# Patient Record
Sex: FEMALE | Race: WHITE | NOT HISPANIC OR LATINO | URBAN - METROPOLITAN AREA
[De-identification: names, ages, dates, MRNs, and addresses within clinical notes are randomized per-mention and may not be internally consistent; named-entity substitution may affect disease eponyms.]

---

## 2018-10-01 ENCOUNTER — OUTPATIENT (OUTPATIENT)
Dept: OUTPATIENT SERVICES | Facility: HOSPITAL | Age: 53
LOS: 1 days | End: 2018-10-01
Payer: MEDICARE

## 2018-11-15 DIAGNOSIS — Z71.89 OTHER SPECIFIED COUNSELING: ICD-10-CM

## 2019-01-14 ENCOUNTER — APPOINTMENT (OUTPATIENT)
Dept: DERMATOLOGY | Facility: CLINIC | Age: 54
End: 2019-01-14
Payer: MEDICARE

## 2019-01-14 PROCEDURE — 99202 OFFICE O/P NEW SF 15 MIN: CPT

## 2019-01-28 ENCOUNTER — APPOINTMENT (OUTPATIENT)
Dept: NEUROLOGY | Facility: CLINIC | Age: 54
End: 2019-01-28

## 2019-04-01 PROCEDURE — G9005: CPT

## 2020-01-14 ENCOUNTER — APPOINTMENT (OUTPATIENT)
Dept: DERMATOLOGY | Facility: CLINIC | Age: 55
End: 2020-01-14

## 2020-04-10 ENCOUNTER — APPOINTMENT (OUTPATIENT)
Dept: DERMATOLOGY | Facility: CLINIC | Age: 55
End: 2020-04-10

## 2020-08-29 ENCOUNTER — APPOINTMENT (OUTPATIENT)
Dept: DERMATOLOGY | Facility: CLINIC | Age: 55
End: 2020-08-29

## 2020-10-06 ENCOUNTER — EMERGENCY (EMERGENCY)
Facility: HOSPITAL | Age: 55
LOS: 1 days | Discharge: HOME | End: 2020-10-06
Attending: EMERGENCY MEDICINE
Payer: MEDICARE

## 2020-10-06 VITALS
SYSTOLIC BLOOD PRESSURE: 150 MMHG | RESPIRATION RATE: 18 BRPM | DIASTOLIC BLOOD PRESSURE: 89 MMHG | WEIGHT: 218.92 LBS | HEIGHT: 62 IN | OXYGEN SATURATION: 98 % | TEMPERATURE: 98 F | HEART RATE: 67 BPM

## 2020-10-06 VITALS
DIASTOLIC BLOOD PRESSURE: 77 MMHG | RESPIRATION RATE: 18 BRPM | TEMPERATURE: 97 F | OXYGEN SATURATION: 97 % | HEART RATE: 76 BPM | SYSTOLIC BLOOD PRESSURE: 118 MMHG

## 2020-10-06 DIAGNOSIS — F41.9 ANXIETY DISORDER, UNSPECIFIED: ICD-10-CM

## 2020-10-06 LAB
ALBUMIN SERPL ELPH-MCNC: 3.6 G/DL — SIGNIFICANT CHANGE UP (ref 3.3–5.2)
ALP SERPL-CCNC: 130 U/L — HIGH (ref 40–120)
ALT FLD-CCNC: 24 U/L — SIGNIFICANT CHANGE UP
AMPHET UR-MCNC: NEGATIVE — SIGNIFICANT CHANGE UP
ANION GAP SERPL CALC-SCNC: 10 MMOL/L — SIGNIFICANT CHANGE UP (ref 5–17)
APAP SERPL-MCNC: <3 UG/ML — LOW (ref 10–26)
APPEARANCE UR: CLEAR — SIGNIFICANT CHANGE UP
AST SERPL-CCNC: 21 U/L — SIGNIFICANT CHANGE UP
BACTERIA # UR AUTO: NEGATIVE — SIGNIFICANT CHANGE UP
BARBITURATES UR SCN-MCNC: POSITIVE
BENZODIAZ UR-MCNC: NEGATIVE — SIGNIFICANT CHANGE UP
BILIRUB SERPL-MCNC: 0.2 MG/DL — LOW (ref 0.4–2)
BILIRUB UR-MCNC: NEGATIVE — SIGNIFICANT CHANGE UP
BUN SERPL-MCNC: 11 MG/DL — SIGNIFICANT CHANGE UP (ref 8–20)
CALCIUM SERPL-MCNC: 8.6 MG/DL — SIGNIFICANT CHANGE UP (ref 8.6–10.2)
CHLORIDE SERPL-SCNC: 105 MMOL/L — SIGNIFICANT CHANGE UP (ref 98–107)
CO2 SERPL-SCNC: 27 MMOL/L — SIGNIFICANT CHANGE UP (ref 22–29)
COCAINE METAB.OTHER UR-MCNC: NEGATIVE — SIGNIFICANT CHANGE UP
COLOR SPEC: YELLOW — SIGNIFICANT CHANGE UP
CREAT SERPL-MCNC: 0.78 MG/DL — SIGNIFICANT CHANGE UP (ref 0.5–1.3)
DIFF PNL FLD: ABNORMAL
EPI CELLS # UR: SIGNIFICANT CHANGE UP
ETHANOL SERPL-MCNC: <10 MG/DL — SIGNIFICANT CHANGE UP
GLUCOSE SERPL-MCNC: 94 MG/DL — SIGNIFICANT CHANGE UP (ref 70–99)
GLUCOSE UR QL: NEGATIVE MG/DL — SIGNIFICANT CHANGE UP
HCT VFR BLD CALC: 43.7 % — SIGNIFICANT CHANGE UP (ref 34.5–45)
HGB BLD-MCNC: 14.2 G/DL — SIGNIFICANT CHANGE UP (ref 11.5–15.5)
KETONES UR-MCNC: NEGATIVE — SIGNIFICANT CHANGE UP
LEUKOCYTE ESTERASE UR-ACNC: ABNORMAL
MCHC RBC-ENTMCNC: 28.9 PG — SIGNIFICANT CHANGE UP (ref 27–34)
MCHC RBC-ENTMCNC: 32.5 GM/DL — SIGNIFICANT CHANGE UP (ref 32–36)
MCV RBC AUTO: 88.8 FL — SIGNIFICANT CHANGE UP (ref 80–100)
METHADONE UR-MCNC: NEGATIVE — SIGNIFICANT CHANGE UP
NITRITE UR-MCNC: NEGATIVE — SIGNIFICANT CHANGE UP
OPIATES UR-MCNC: NEGATIVE — SIGNIFICANT CHANGE UP
PCP SPEC-MCNC: SIGNIFICANT CHANGE UP
PCP UR-MCNC: NEGATIVE — SIGNIFICANT CHANGE UP
PH UR: 7 — SIGNIFICANT CHANGE UP (ref 5–8)
PHENOBARB SERPL-MCNC: 20.4 UG/ML — SIGNIFICANT CHANGE UP (ref 15–40)
PLATELET # BLD AUTO: 200 K/UL — SIGNIFICANT CHANGE UP (ref 150–400)
POTASSIUM SERPL-MCNC: 4.2 MMOL/L — SIGNIFICANT CHANGE UP (ref 3.5–5.3)
POTASSIUM SERPL-SCNC: 4.2 MMOL/L — SIGNIFICANT CHANGE UP (ref 3.5–5.3)
PROT SERPL-MCNC: 7.1 G/DL — SIGNIFICANT CHANGE UP (ref 6.6–8.7)
PROT UR-MCNC: NEGATIVE MG/DL — SIGNIFICANT CHANGE UP
RBC # BLD: 4.92 M/UL — SIGNIFICANT CHANGE UP (ref 3.8–5.2)
RBC # FLD: 13.2 % — SIGNIFICANT CHANGE UP (ref 10.3–14.5)
RBC CASTS # UR COMP ASSIST: ABNORMAL /HPF (ref 0–4)
SALICYLATES SERPL-MCNC: <0.6 MG/DL — LOW (ref 10–20)
SARS-COV-2 RNA SPEC QL NAA+PROBE: SIGNIFICANT CHANGE UP
SODIUM SERPL-SCNC: 141 MMOL/L — SIGNIFICANT CHANGE UP (ref 135–145)
SP GR SPEC: 1.01 — SIGNIFICANT CHANGE UP (ref 1.01–1.02)
THC UR QL: NEGATIVE — SIGNIFICANT CHANGE UP
UROBILINOGEN FLD QL: NEGATIVE MG/DL — SIGNIFICANT CHANGE UP
WBC # BLD: 5.7 K/UL — SIGNIFICANT CHANGE UP (ref 3.8–10.5)
WBC # FLD AUTO: 5.7 K/UL — SIGNIFICANT CHANGE UP (ref 3.8–10.5)
WBC UR QL: SIGNIFICANT CHANGE UP

## 2020-10-06 PROCEDURE — 36415 COLL VENOUS BLD VENIPUNCTURE: CPT

## 2020-10-06 PROCEDURE — 99285 EMERGENCY DEPT VISIT HI MDM: CPT

## 2020-10-06 PROCEDURE — U0003: CPT

## 2020-10-06 PROCEDURE — 80053 COMPREHEN METABOLIC PANEL: CPT

## 2020-10-06 PROCEDURE — 80307 DRUG TEST PRSMV CHEM ANLYZR: CPT

## 2020-10-06 PROCEDURE — 85027 COMPLETE CBC AUTOMATED: CPT

## 2020-10-06 PROCEDURE — 99284 EMERGENCY DEPT VISIT MOD MDM: CPT | Mod: CS

## 2020-10-06 PROCEDURE — 81001 URINALYSIS AUTO W/SCOPE: CPT

## 2020-10-06 PROCEDURE — 99284 EMERGENCY DEPT VISIT MOD MDM: CPT

## 2020-10-06 PROCEDURE — 80184 ASSAY OF PHENOBARBITAL: CPT

## 2020-10-06 NOTE — ED ADULT TRIAGE NOTE - NS ED TRIAGE AVPU SCALE
tea
Alert-The patient is alert, awake and responds to voice. The patient is oriented to time, place, and person. The triage nurse is able to obtain subjective information.
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
## 2020-10-06 NOTE — ED BEHAVIORAL HEALTH ASSESSMENT NOTE - NS ED BHA MSE GENERAL APPEARANCE
"Requested Prescriptions   Pending Prescriptions Disp Refills     topiramate (TOPAMAX) 50 MG tablet [Pharmacy Med Name: TOPIRAMATE 50 MG TABLET] 60 tablet 0     Sig: TAKE 1 TABLET BY MOUTH TWICE A DAY    Anti-Seizure Meds Protocol  Failed - 2/7/2019  1:59 AM       Failed - Review Authorizing provider's last note.     Refer to last progress notes: confirm request is for original authorizing provider (cannot be through other providers).       Failed - Normal CBC on file in past 26 months    Recent Labs   Lab Test 08/24/16  0828   WBC 7.8   RBC 4.61   HGB 14.5   HCT 42.9             Failed - Normal platelet count on file in past 26 months    Recent Labs   Lab Test 08/24/16  0828             Passed - Recent (12 mo) or future (30 days) visit within the authorizing provider's specialty    Patient had office visit in the last 12 months or has a visit in the next 30 days with authorizing provider or within the authorizing provider's specialty.  See \"Patient Info\" tab in inbasket, or \"Choose Columns\" in Meds & Orders section of the refill encounter.           Passed - Normal ALT or AST on file in past 26 months    Recent Labs   Lab Test 01/11/19  0818   ALT 31     Recent Labs   Lab Test 01/11/19  0818   AST 20          Passed - Medication is active on med list        topiramate (TOPAMAX) 50 MG tablet  Rx was sent 01/11/2019 for 90 tabs and 3 refills.   Pharmacy notified via E-prescribe refusal.  Chrissy Fountain, RN, BSN       "
Well developed

## 2020-10-06 NOTE — ED PROVIDER NOTE - PHYSICAL EXAMINATION
Alert, lucid, and  anx. Pt is normocephalic, atraumatic.  Pupils are equal, round, no dysmetria. External ear without bleeding or mass, no nasal discharge or malodor, lips pink, moist mucous membranes, tongue midline. Neck supple.   Lungs clear . Heart regular rate and rhythm, normal S1, S2, no murmurs, gallops, rubs.  Abdomen is soft, nontender, no pulsatile mass, no masses, no distension, no rebound. No CVA Tenderness, no suprapubic tenderness.   Non-focal sensory, 5 out of 5 motor strength. CN2 to 12 intact. Skin without rash,psych - denies si or hi
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
## 2020-10-06 NOTE — ED ADULT NURSE NOTE - CCCP TRG CHIEF CMPLNT
Lm to call back, she needs to speak to RN triage. TY  
Patients LMP was 5/7. Patient was scheduled by different offic for FOB without an US. Patient calling today to be seen sooner for an appointment. Patient states she has been having spotting and cramping.    Patient was informed that she may need to change her appointment because she has not yet had an US/  
Spoke to patient, she stated that she has had some spotting when she wipes but has not needed to place a pantyliner.  She reports that there have been 2 or 3 episodes of spotting since she found out she was pregnant.  The patient also says she has had some on and off cramping but more so at the beginning of the month.  It was explained that the cramping at the beginning of the month may have been due to implantation as well as the spotting at that time.   The patient was also told that there are many reasons for spotting in pregnancy as well so, at this point, she should continue to monitor her symptoms.  She was advised to call if she begins to have menstrual-like bleeding and/or severe cramping.  The patient stated her understanding and agreement.   
medical evaluation

## 2020-10-06 NOTE — ED PROVIDER NOTE - PATIENT PORTAL LINK FT
You can access the FollowMyHealth Patient Portal offered by [facility] by registering at the following website: [email or web address]. By joining [employer]’s FollowMyHealth portal, you will also be able to view your health information using other applications (apps) compatible with our system.
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
## 2020-10-06 NOTE — ED BEHAVIORAL HEALTH ASSESSMENT NOTE - DESCRIPTION
See ED notes     Vital Signs Last 24 Hrs  T(C): 36.6 (06 Oct 2020 09:58), Max: 36.6 (06 Oct 2020 09:58)  T(F): 97.8 (06 Oct 2020 09:58), Max: 97.8 (06 Oct 2020 09:58)  HR: 67 (06 Oct 2020 09:58) (67 - 67)  BP: 150/89 (06 Oct 2020 09:58) (150/89 - 150/89)  RR: 18 (06 Oct 2020 09:58) (18 - 18)  SpO2: 98% (06 Oct 2020 09:58) (98% - 98%)    Urinalysis Basic - ( 06 Oct 2020 11:37 )    Color: Yellow / Appearance: Clear / S.010 / pH: x  Gluc: x / Ketone: Negative  / Bili: Negative / Urobili: Negative mg/dL   Blood: x / Protein: Negative mg/dL / Nitrite: Negative   Leuk Esterase: Trace / RBC: 3-5 /HPF / WBC 3-5   Sq Epi: x / Non Sq Epi: Occasional / Bacteria: Negative    Barbiturates Screen, Urine (10.06.20 @ 11:37)    Barbiturates Screen, Urine: Positive Seizure disorder Lives in supportive housing through [facility]
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
## 2020-10-23 ENCOUNTER — APPOINTMENT (OUTPATIENT)
Dept: DERMATOLOGY | Facility: CLINIC | Age: 55
End: 2020-10-23
Payer: MEDICARE

## 2020-10-23 PROCEDURE — 99214 OFFICE O/P EST MOD 30 MIN: CPT | Mod: 25

## 2020-10-23 PROCEDURE — 17110 DESTRUCTION B9 LES UP TO 14: CPT

## 2020-12-06 ENCOUNTER — EMERGENCY (EMERGENCY)
Facility: HOSPITAL | Age: 55
LOS: 1 days | Discharge: HOME | End: 2020-12-06
Attending: EMERGENCY MEDICINE
Payer: MEDICARE

## 2020-12-06 VITALS
OXYGEN SATURATION: 96 % | RESPIRATION RATE: 18 BRPM | DIASTOLIC BLOOD PRESSURE: 74 MMHG | SYSTOLIC BLOOD PRESSURE: 126 MMHG | HEART RATE: 87 BPM | TEMPERATURE: 98 F

## 2020-12-06 VITALS — HEIGHT: 62 IN

## 2020-12-06 PROCEDURE — 99283 EMERGENCY DEPT VISIT LOW MDM: CPT
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
## 2020-12-06 NOTE — ED ADULT TRIAGE NOTE - CHIEF COMPLAINT QUOTE
As per EMS, pt combative at home and presents with altered mental status. Pt denies any complaints at this time, yelling at staff in triage. MD CG at bedside for evaluation. Security and [employer] at bedside.

## 2020-12-06 NOTE — ED PROVIDER NOTE - PATIENT PORTAL LINK FT
You can access the FollowMyHealth Patient Portal offered by [facility] by registering at the following website: [email or web address]. By joining [employer]’s FollowMyHealth portal, you will also be able to view your health information using other applications (apps) compatible with our system.

## 2020-12-11 ENCOUNTER — TRANSCRIPTION ENCOUNTER (OUTPATIENT)
Age: 55
End: 2020-12-11

## 2021-03-12 ENCOUNTER — EMERGENCY (EMERGENCY)
Facility: HOSPITAL | Age: 56
LOS: 1 days | Discharge: HOME | End: 2021-03-12
Attending: EMERGENCY MEDICINE
Payer: MEDICARE

## 2021-03-12 VITALS
TEMPERATURE: 98 F | HEIGHT: 62 IN | RESPIRATION RATE: 16 BRPM | DIASTOLIC BLOOD PRESSURE: 89 MMHG | OXYGEN SATURATION: 95 % | WEIGHT: 149.91 LBS | HEART RATE: 98 BPM | SYSTOLIC BLOOD PRESSURE: 148 MMHG

## 2021-03-12 PROCEDURE — 99283 EMERGENCY DEPT VISIT LOW MDM: CPT

## 2021-03-12 NOTE — ED PROVIDER NOTE - NSFOLLOWUPINSTRUCTIONS_ED_ALL_ED_FT
Follow up with your  if you have more questions about your housing. Return to ED for any medical issues needing emergent attention.
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
## 2021-03-12 NOTE — ED PROVIDER NOTE - PATIENT PORTAL LINK FT
You can access the FollowMyHealth Patient Portal offered by [facility] by registering at the following website: [email or web address]. By joining [email or web address]’s FollowMyHealth portal, you will also be able to view your health information using other applications (apps) compatible with our system.

## 2021-03-12 NOTE — ED ADULT NURSE REASSESSMENT NOTE - NS ED NURSE REASSESS COMMENT FT1
pt awaiting social work, reports she wants to go home, she has been here all day and would like to leave. pt is awake alert oriented in no apparent distress, even and unlabored resps, AOX3, ambulates in ED without difficulty. states she spoke with her  already and wants to go. SW and ER MD aware.

## 2021-03-12 NOTE — ED ADULT NURSE NOTE - OBJECTIVE STATEMENT
pt BIBA from home with reports of not liking where she lives and having back pain. pt awake and alert to person place and time, report she needs help with housing. pt with no oboivus injuries, even and unlabored resps, no fevers, VSS.

## 2021-05-07 NOTE — ED PROVIDER NOTE - CROS ED [facility] ALL NEG
Detail Level: Zone
Plan: Pt given copy of her pathology and counseled heavily on her diagnosis
Continue Regimen: Minocycline 100mg BID
Initiate Treatment: Ordered labs and Chest X Ray, refer to ophthalmologist and pulmonologist
negative...
NP from neurology

## 2021-09-02 PROBLEM — R56.9 UNSPECIFIED CONVULSIONS: Chronic | Status: ACTIVE | Noted: 2021-03-12

## 2021-09-02 PROBLEM — F32.9 MAJOR DEPRESSIVE DISORDER, SINGLE EPISODE, UNSPECIFIED: Chronic | Status: ACTIVE | Noted: 2021-03-12

## 2021-10-05 ENCOUNTER — APPOINTMENT (OUTPATIENT)
Dept: DERMATOLOGY | Facility: CLINIC | Age: 56
End: 2021-10-05

## 2021-10-15 ENCOUNTER — APPOINTMENT (OUTPATIENT)
Dept: DERMATOLOGY | Facility: CLINIC | Age: 56
End: 2021-10-15
Payer: MEDICARE

## 2021-10-15 PROCEDURE — 99214 OFFICE O/P EST MOD 30 MIN: CPT

## 2022-02-04 NOTE — ED BEHAVIORAL HEALTH ASSESSMENT NOTE - OTHER
EMERGENCY DEPARTMENT ENCOUNTER    Pt Name: Dalia Hendricks  MRN: [id number]  Pt :   2003  Room Number:  26SF/26  Date of encounter:  2022  PCP: Trace Mccauley MD  ED Provider: Shankar Valdivia MD    Historian: Patient and mother      HPI:  Chief Complaint: Abdominal pain        Context: Dalia Hendricks is a 18 y.o. female who presents to the ED c/o abdominal pain which started today around 11 AM.  The discomfort is throughout her lower abdomen.  It ramped up very quickly to a 8 out of 10 on the pain scale level.  She describes an achy sensation.  The discomfort gradually improved and now is down to a 3 out of 10 on the pain scale.  Most of her discomfort is in the left low to mid abdomen.  She did have a bowel movement prior to coming to the emergency department.  She does not feel that she is constipated but did have an ED visit for abdominal pain when she was much younger and the diagnosis was constipation.  She has not previously seen an OB/GYN and does not know of any ovarian cysts.  She did start her menstrual cycle last night.  She did have a similar episode of pain roughly 3 years ago associated with menses.  The patient was transiently nauseated and did vomit prior to coming to the emergency department.  She now feels mildly nauseated but otherwise relatively well.  She has had no dysuria or hematuria.      PAST MEDICAL HISTORY  History reviewed. No pertinent past medical history.      PAST SURGICAL HISTORY  History reviewed. No pertinent surgical history.      FAMILY HISTORY  History reviewed. No pertinent family history.      SOCIAL HISTORY  Social History     Socioeconomic History   • Marital status: Single   Tobacco Use   • Smoking status: Never Smoker   Substance and Sexual Activity   • Alcohol use: Never   • Drug use: Never   • Sexual activity: Defer         ALLERGIES  Rocephin [ceftriaxone]        REVIEW OF SYSTEMS  Review of Systems       All systems reviewed and negative except  for those discussed in HPI.       PHYSICAL EXAM    I have reviewed the triage vital signs and nursing notes.    ED Triage Vitals [02/04/22 1428]   Temp Heart Rate Resp BP SpO2   97.7 °F (36.5 °C) 64 16 140/52 100 %      Temp src Heart Rate Source Patient Position BP Location FiO2 (%)   Oral Monitor Sitting Left arm --       Physical Exam  GENERAL:   Appears pleasant and in no acute distress.  HENT: Nares patent.  EYES: No scleral icterus.  CV: Regular rhythm, regular rate.  No murmurs gallops rubs  RESPIRATORY: Normal effort.  No audible wheezes, rales or rhonchi.  Clear to auscultation  ABDOMEN: Soft, mildly tender to palpation in the suprapubic region with more tenderness in the left abdomen just below the umbilicus.  There is minimal tenderness at McBurney's point.  There is definitely no guarding rebound or rigidity.  Psoas sign is negative.  Bowel sounds within normal limits.  MUSCULOSKELETAL: No deformities.  No CVA tenderness.  NEURO: Alert, moves all extremities, follows commands.  SKIN: Warm, dry, no rash visualized.        LAB RESULTS  Recent Results (from the past 24 hour(s))   Comprehensive Metabolic Panel    Collection Time: 02/04/22  3:05 PM    Specimen: Blood   Result Value Ref Range    Glucose 96 65 - 99 mg/dL    BUN 13 6 - 20 mg/dL    Creatinine 0.94 0.57 - 1.00 mg/dL    Sodium 138 136 - 145 mmol/L    Potassium 4.3 3.5 - 5.2 mmol/L    Chloride 106 98 - 107 mmol/L    CO2 20.0 (L) 22.0 - 29.0 mmol/L    Calcium 9.4 8.6 - 10.5 mg/dL    Total Protein 7.8 6.0 - 8.5 g/dL    Albumin 4.90 3.50 - 5.20 g/dL    ALT (SGPT) 10 1 - 33 U/L    AST (SGOT) 17 1 - 32 U/L    Alkaline Phosphatase 65 43 - 101 U/L    Total Bilirubin 0.4 0.0 - 1.2 mg/dL    eGFR Non African Amer 78 >60 mL/min/1.73    Globulin 2.9 gm/dL    A/G Ratio 1.7 g/dL    BUN/Creatinine Ratio 13.8 7.0 - 25.0    Anion Gap 12.0 5.0 - 15.0 mmol/L   Lipase    Collection Time: 02/04/22  3:05 PM    Specimen: Blood   Result Value Ref Range    Lipase 36 13 - 60  U/L   Green Top (Gel)    Collection Time: 02/04/22  3:05 PM   Result Value Ref Range    Extra Tube Hold for add-ons.    Lavender Top    Collection Time: 02/04/22  3:05 PM   Result Value Ref Range    Extra Tube hold for add-on    Gold Top - SST    Collection Time: 02/04/22  3:05 PM   Result Value Ref Range    Extra Tube Hold for add-ons.    Light Blue Top    Collection Time: 02/04/22  3:05 PM   Result Value Ref Range    Extra Tube hold for add-on    CBC Auto Differential    Collection Time: 02/04/22  3:05 PM    Specimen: Blood   Result Value Ref Range    WBC 13.35 (H) 3.40 - 10.80 10*3/mm3    RBC 4.33 3.77 - 5.28 10*6/mm3    Hemoglobin 13.0 12.0 - 15.9 g/dL    Hematocrit 39.4 34.0 - 46.6 %    MCV 91.0 79.0 - 97.0 fL    MCH 30.0 26.6 - 33.0 pg    MCHC 33.0 31.5 - 35.7 g/dL    RDW 12.1 (L) 12.3 - 15.4 %    RDW-SD 40.6 37.0 - 54.0 fl    MPV 10.3 6.0 - 12.0 fL    Platelets 183 140 - 450 10*3/mm3    Neutrophil % 89.7 (H) 42.7 - 76.0 %    Lymphocyte % 5.8 (L) 19.6 - 45.3 %    Monocyte % 4.1 (L) 5.0 - 12.0 %    Eosinophil % 0.1 (L) 0.3 - 6.2 %    Basophil % 0.1 0.0 - 1.5 %    Immature Grans % 0.2 0.0 - 0.5 %    Neutrophils, Absolute 11.96 (H) 1.70 - 7.00 10*3/mm3    Lymphocytes, Absolute 0.78 0.70 - 3.10 10*3/mm3    Monocytes, Absolute 0.55 0.10 - 0.90 10*3/mm3    Eosinophils, Absolute 0.01 0.00 - 0.40 10*3/mm3    Basophils, Absolute 0.02 0.00 - 0.20 10*3/mm3    Immature Grans, Absolute 0.03 0.00 - 0.05 10*3/mm3    nRBC 0.0 0.0 - 0.2 /100 WBC   POC Pregnancy, Urine    Collection Time: 02/04/22  3:14 PM    Specimen: Urine   Result Value Ref Range    HCG, Urine, QL Negative Negative    Lot Number [id number]     Internal Positive Control Positive Positive, Passed    Internal Negative Control Negative Negative, Passed    Expiration Date 20230,539    Urinalysis With Microscopic If Indicated (No Culture) - Urine, Clean Catch    Collection Time: 02/04/22  4:00 PM    Specimen: Urine, Clean Catch   Result Value Ref Range    Color,  UA Yellow Yellow, Straw    Appearance, UA Clear Clear    pH, UA 7.0 5.0 - 8.0    Specific Gravity, UA 1.015 1.001 - 1.030    Glucose, UA Negative Negative    Ketones, UA Negative Negative    Bilirubin, UA Negative Negative    Blood, UA Negative Negative    Protein, UA Negative Negative    Leuk Esterase, UA Negative Negative    Nitrite, UA Negative Negative    Urobilinogen, UA 1.0 E.U./dL 0.2 - 1.0 E.U./dL       If labs were ordered, I independently reviewed the results.        RADIOLOGY  No Radiology Exams Resulted Within Past 24 Hours    I ordered and reviewed the above noted radiographic studies.      See radiologist's dictation for official interpretation.        PROCEDURES    Procedures    No orders to display       MEDICATIONS GIVEN IN ER    Medications   sodium chloride 0.9 % flush 10 mL (has no administration in time range)   sodium chloride 0.9 % bolus 1,000 mL (1,000 mL Intravenous New Bag 2/4/22 1521)   ondansetron (ZOFRAN) injection 4 mg (4 mg Intravenous Given 2/4/22 1522)   ketorolac (TORADOL) injection 15 mg (15 mg Intravenous Given 2/4/22 1557)         PROGRESS, DATA ANALYSIS, CONSULTS, AND MEDICAL DECISION MAKING    All labs have been independently reviewed by me.  All radiology studies have been reviewed by me and the radiologist dictating the report.   EKG's have been independently viewed and interpreted by me.      Differential diagnoses: Menstrual related discomfort versus ovarian cysts/torsion versus UTI, constipation, appendicitis, etc.      ED Course as of 02/04/22 1631   Fri Feb 04, 2022   1625 The patient has been a stable status.  I spoke with the patient and her mother in detail about our findings to include her elevated white blood cell count.  Given her recent vomiting and discomfort believe that this could be stress demargination.  Of course early appendicitis or other more serious etiologies are considered and discussed in detail with both of them.  They understand that I would not  perform CT scan on a family member given this presentation and they are both in agreement that discharge to home with return precautions is most appropriate. [MS]      ED Course User Index  [MS] Shankar Valdivia MD             AS OF 16:31 EST VITALS:    BP - 140/52  HR - 64  TEMP - 97.7 °F (36.5 °C) (Oral)  O2 SATS - 100%                  DIAGNOSIS  Final diagnoses:   Nonspecific abdominal pain         DISPOSITION  DISCHARGE    Patient discharged in stable condition.    Reviewed implications of results, diagnosis, meds, responsibility to follow up, warning signs and symptoms of possible worsening, potential complications and reasons to return to ER.    Patient/Family voiced understanding of above instructions.    Discussed plan for discharge, as there is no emergent indication for admission.  Pt/family is agreeable and understands need for follow up and possible repeat testing.  Pt/family is aware that discharge does not mean that nothing is wrong but that it indicates no emergency is currently present that requires admission and they must continue care with follow-up as given below or with a physician of their choice.     FOLLOW-UP  Trace Mccauley MD  [address] [place], [place] [id number] [phone]      NEXT AVAILABLE APPOINTMENT - RECHECK OF CONDITION    [facility] Emergency Department  [address] [facility] [address] [phone]    IF YOU HAVE ANY CONCERN OF WORSENING CONDITION         Medication List      No changes were made to your prescriptions during this visit.                  Shankar Valdivia MD  02/04/22 2021     [facility] poor living situation

## 2022-09-14 ENCOUNTER — APPOINTMENT (OUTPATIENT)
Dept: VASCULAR SURGERY | Facility: CLINIC | Age: 57
End: 2022-09-14

## 2022-10-18 ENCOUNTER — APPOINTMENT (OUTPATIENT)
Dept: DERMATOLOGY | Facility: CLINIC | Age: 57
End: 2022-10-18

## 2022-10-19 ENCOUNTER — APPOINTMENT (OUTPATIENT)
Dept: VASCULAR SURGERY | Facility: CLINIC | Age: 57
End: 2022-10-19

## 2022-10-19 VITALS
DIASTOLIC BLOOD PRESSURE: 79 MMHG | TEMPERATURE: 97.1 F | SYSTOLIC BLOOD PRESSURE: 147 MMHG | HEART RATE: 93 BPM | OXYGEN SATURATION: 95 % | RESPIRATION RATE: 16 BRPM

## 2022-10-19 DIAGNOSIS — Z87.09 PERSONAL HISTORY OF OTHER DISEASES OF THE RESPIRATORY SYSTEM: ICD-10-CM

## 2022-10-19 DIAGNOSIS — Z86.39 PERSONAL HISTORY OF OTHER ENDOCRINE, NUTRITIONAL AND METABOLIC DISEASE: ICD-10-CM

## 2022-10-19 DIAGNOSIS — G40.909 EPILEPSY, UNSPECIFIED, NOT INTRACTABLE, W/OUT STATUS EPILEPTICUS: ICD-10-CM

## 2022-10-19 DIAGNOSIS — Z87.39 PERSONAL HISTORY OF OTHER DISEASES OF THE MUSCULOSKELETAL SYSTEM AND CONNECTIVE TISSUE: ICD-10-CM

## 2022-10-19 PROCEDURE — 99213 OFFICE O/P EST LOW 20 MIN: CPT

## 2022-10-19 PROCEDURE — 93923 UPR/LXTR ART STDY 3+ LVLS: CPT

## 2022-10-19 PROCEDURE — 93970 EXTREMITY STUDY: CPT

## 2022-10-21 PROBLEM — Z87.09 HISTORY OF ASTHMA: Status: RESOLVED | Noted: 2022-10-19 | Resolved: 2022-10-21

## 2022-10-21 PROBLEM — Z86.39 HISTORY OF THYROID DISEASE: Status: RESOLVED | Noted: 2022-10-19 | Resolved: 2022-10-21

## 2022-10-21 PROBLEM — Z86.39 HISTORY OF HIGH CHOLESTEROL: Status: RESOLVED | Noted: 2022-10-19 | Resolved: 2022-10-21

## 2022-10-21 PROBLEM — Z87.39 HISTORY OF ARTHRITIS: Status: RESOLVED | Noted: 2022-10-19 | Resolved: 2022-10-21

## 2022-10-21 PROBLEM — G40.909 EPILEPSY: Status: RESOLVED | Noted: 2022-10-19 | Resolved: 2022-10-21

## 2022-11-04 NOTE — ADDENDUM
[FreeTextEntry1] : Patient presents with lymphedema secondary to venous reflux. The patient has tried 4 weeks use of compression (20-30mmHg), elevation, and exercise with no improvement. The patient had a 1X with a basic pneumatic compression pump (entre [id number]) set at 30mmHg for 20 minutes with [employer] on 10/20/22. The static pressure of the basic pump caused pain due to skin sensitivity and scarring. In addition, the patient has truncal edema which was exasperated by use of the pump. Now recommending, the Flexitouch advanced pneumatic compression pump to treat their full leg and core.

## 2022-11-04 NOTE — PHYSICAL EXAM
[2+] : left 2+ [Ankle Swelling (On Exam)] : present [Ankle Swelling Bilaterally] : severe [] : bilaterally [Ankle Swelling On The Right] : mild [Varicose Veins Of Lower Extremities] : not present [de-identified] : NAD.

## 2022-11-04 NOTE — HISTORY OF PRESENT ILLNESS
[FreeTextEntry1] : 55 yo PMHx arthritis, asthma, epilepsy, HLD, hypothyroid, presents with bilateral leg pain and swelling and abnormal screening URBAN. Pt had a screening URBAN from his insurance which was abdominal. Pt denies claudication when walking. She has BLE edema and pain for the past two months. She feels her right leg is more swollen than left.

## 2022-11-04 NOTE — ASSESSMENT
[FreeTextEntry1] : 57 yo female with bilateral leg edema. Normal URBAN/PVR. Pt has left GSV insufficiency on duplex but RLE edema is worse than left. \par \par Pt counseled on results of duplex and above diagnosis.\par Gave pt rx for compression stockings \par Will refer to [employer]ile for lymphedema massage pump \par RTC in 2 months to monitor symptoms \par \par A total of 30 minutes was spent with patient and coordinating care.\par \par

## 2022-11-04 NOTE — PROCEDURE
Chief Complaint   Patient presents with     Pharyngitis     x 1 day, fevers around 101 last night, vomited today       Initial Temp 101.7  F (38.7  C) (Temporal)  Wt 72 lb 12.8 oz (33 kg) Estimated body mass index is 18.92 kg/(m^2) as calculated from the following:    Height as of 10/19/15: 4' (1.219 m).    Weight as of 10/19/15: 62 lb (28.1 kg).  Medication Reconciliation: complete     [FreeTextEntry1] : Studies: \par 10/19/22 URBAN/PVR: right 1.06, left 1.07. Triphasic waveforms throughout \par \par 10/19/22 BLE venous duplex: \par right: no insufficiency or DVT \par left: GSV enlarged with > 2 sec reflux. no DVT

## 2022-12-20 ENCOUNTER — APPOINTMENT (OUTPATIENT)
Dept: VASCULAR SURGERY | Facility: CLINIC | Age: 57
End: 2022-12-20

## 2023-02-22 ENCOUNTER — APPOINTMENT (OUTPATIENT)
Dept: VASCULAR SURGERY | Facility: CLINIC | Age: 58
End: 2023-02-22
Payer: MEDICARE

## 2023-02-22 VITALS
HEART RATE: 84 BPM | TEMPERATURE: 97.8 F | OXYGEN SATURATION: 96 % | DIASTOLIC BLOOD PRESSURE: 84 MMHG | SYSTOLIC BLOOD PRESSURE: 158 MMHG | RESPIRATION RATE: 16 BRPM

## 2023-02-22 DIAGNOSIS — R60.0 LOCALIZED EDEMA: ICD-10-CM

## 2023-02-22 PROCEDURE — 99213 OFFICE O/P EST LOW 20 MIN: CPT

## 2023-02-22 NOTE — PROCEDURE
[FreeTextEntry1] : Studies: \par 10/19/22 URBAN/PVR: right 1.06, left 1.07. Triphasic waveforms throughout \par \par 10/19/22 BLE venous duplex: \par right: no insufficiency or DVT \par left: GSV enlarged with > 2 sec reflux. no DVT

## 2023-02-22 NOTE — PHYSICAL EXAM
[2+] : left 2+ [Ankle Swelling (On Exam)] : present [Ankle Swelling Bilaterally] : severe [] : bilaterally [Ankle Swelling On The Right] : mild [Varicose Veins Of Lower Extremities] : not present [de-identified] : NAD.

## 2023-02-22 NOTE — ASSESSMENT
[FreeTextEntry1] : 57 yo female with bilateral leg edema. Normal URBAN/PVR. Pt has left GSV insufficiency on duplex but RLE edema is worse than left. \par \par Pt counseled again on results of duplex and above diagnosis.\par Gave pt rx for compression stockings. Measured pt in office. \par Pt counseled to use lymphedema massage pump 1 hour per day \par RTC in 2 months to monitor symptoms \par \par A total of 20 minutes was spent with patient and coordinating care.\par \par

## 2023-02-22 NOTE — HISTORY OF PRESENT ILLNESS
[FreeTextEntry1] : 10/19/22: 57 yo PMHx arthritis, asthma, epilepsy, HLD, hypothyroid, presents with bilateral leg pain and swelling and abnormal screening URBAN. Pt had a screening URBAN from his insurance which was abdominal. Pt denies claudication when walking. She has BLE edema and pain for the past two months. She feels her right leg is more swollen than left. \par \par 2/22/23: Pt is doing okay since last visit. She did not get compression stockings. She got the lymphedema massage pump but only uses it a few times. She has same symptoms as first visit. BLE edema, right > left.

## 2023-04-17 ENCOUNTER — APPOINTMENT (OUTPATIENT)
Dept: VASCULAR SURGERY | Facility: CLINIC | Age: 58
End: 2023-04-17

## 2024-04-18 ENCOUNTER — APPOINTMENT (OUTPATIENT)
Dept: DERMATOLOGY | Facility: CLINIC | Age: 59
End: 2024-04-18

## 2024-09-09 ENCOUNTER — OFFICE (OUTPATIENT)
Dept: URBAN - METROPOLITAN AREA CLINIC 116 | Facility: CLINIC | Age: 59
Setting detail: OPHTHALMOLOGY
End: 2024-09-09
Payer: MEDICARE

## 2024-09-09 DIAGNOSIS — H25.13: ICD-10-CM

## 2024-09-09 DIAGNOSIS — H10.433: ICD-10-CM

## 2024-09-09 PROBLEM — H52.4 PRESBYOPIA: Status: ACTIVE | Noted: 2024-09-09

## 2024-09-09 PROCEDURE — 92014 COMPRE OPH EXAM EST PT 1/>: CPT | Performed by: OPTOMETRIST

## 2024-09-09 ASSESSMENT — CONFRONTATIONAL VISUAL FIELD TEST (CVF)
OD_FINDINGS: FULL
OS_FINDINGS: FULL

## 2024-09-30 ENCOUNTER — NON-APPOINTMENT (OUTPATIENT)
Age: 59
End: 2024-09-30

## 2024-09-30 DIAGNOSIS — R26.89 OTHER ABNORMALITIES OF GAIT AND MOBILITY: ICD-10-CM

## 2024-09-30 DIAGNOSIS — Z87.898 PERSONAL HISTORY OF OTHER SPECIFIED CONDITIONS: ICD-10-CM

## 2024-09-30 DIAGNOSIS — I73.89 OTHER SPECIFIED PERIPHERAL VASCULAR DISEASES: ICD-10-CM

## 2024-09-30 DIAGNOSIS — M20.10 HALLUX VALGUS (ACQUIRED), UNSPECIFIED FOOT: ICD-10-CM

## 2024-09-30 DIAGNOSIS — M21.6X9 OTHER ACQUIRED DEFORMITIES OF UNSPECIFIED FOOT: ICD-10-CM

## 2024-09-30 DIAGNOSIS — G60.8 OTHER HEREDITARY AND IDIOPATHIC NEUROPATHIES: ICD-10-CM

## 2024-09-30 DIAGNOSIS — M21.379 FOOT DROP, UNSPECIFIED FOOT: ICD-10-CM

## 2024-09-30 DIAGNOSIS — R56.9 UNSPECIFIED CONVULSIONS: ICD-10-CM

## 2024-09-30 DIAGNOSIS — Z86.69 PERSONAL HISTORY OF OTHER DISEASES OF THE NERVOUS SYSTEM AND SENSE ORGANS: ICD-10-CM

## 2024-09-30 DIAGNOSIS — B35.1 TINEA UNGUIUM: ICD-10-CM

## 2024-09-30 RX ORDER — KETOCONAZOLE 20 MG/G
2 CREAM TOPICAL
Refills: 0 | Status: ACTIVE | COMMUNITY

## 2024-09-30 RX ORDER — GABAPENTIN 300 MG/1
300 CAPSULE ORAL
Refills: 0 | Status: ACTIVE | COMMUNITY

## 2024-10-22 ENCOUNTER — APPOINTMENT (OUTPATIENT)
Age: 59
End: 2024-10-22

## 2024-10-31 ENCOUNTER — APPOINTMENT (OUTPATIENT)
Dept: DERMATOLOGY | Facility: CLINIC | Age: 59
End: 2024-10-31

## 2024-11-04 ENCOUNTER — APPOINTMENT (OUTPATIENT)
Age: 59
End: 2024-11-04
Payer: MEDICARE

## 2024-11-04 VITALS — WEIGHT: 230 LBS | HEIGHT: 62 IN | BODY MASS INDEX: 42.33 KG/M2

## 2024-11-04 DIAGNOSIS — M79.672 PAIN IN LEFT FOOT: ICD-10-CM

## 2024-11-04 DIAGNOSIS — R60.0 LOCALIZED EDEMA: ICD-10-CM

## 2024-11-04 DIAGNOSIS — G60.8 OTHER HEREDITARY AND IDIOPATHIC NEUROPATHIES: ICD-10-CM

## 2024-11-04 DIAGNOSIS — M21.6X9 OTHER ACQUIRED DEFORMITIES OF UNSPECIFIED FOOT: ICD-10-CM

## 2024-11-04 DIAGNOSIS — M79.671 PAIN IN RIGHT FOOT: ICD-10-CM

## 2024-11-04 DIAGNOSIS — B35.1 TINEA UNGUIUM: ICD-10-CM

## 2024-11-04 DIAGNOSIS — I73.89 OTHER SPECIFIED PERIPHERAL VASCULAR DISEASES: ICD-10-CM

## 2024-11-04 PROCEDURE — 11721 DEBRIDE NAIL 6 OR MORE: CPT | Mod: Q8

## 2024-11-04 PROCEDURE — 99203 OFFICE O/P NEW LOW 30 MIN: CPT | Mod: 25

## 2024-11-13 ENCOUNTER — APPOINTMENT (OUTPATIENT)
Dept: DERMATOLOGY | Facility: CLINIC | Age: 59
End: 2024-11-13
Payer: MEDICARE

## 2024-11-13 PROCEDURE — 99204 OFFICE O/P NEW MOD 45 MIN: CPT

## 2025-02-05 ENCOUNTER — APPOINTMENT (OUTPATIENT)
Age: 60
End: 2025-02-05
Payer: MEDICARE

## 2025-02-05 DIAGNOSIS — R60.0 LOCALIZED EDEMA: ICD-10-CM

## 2025-02-05 DIAGNOSIS — M79.672 PAIN IN LEFT FOOT: ICD-10-CM

## 2025-02-05 DIAGNOSIS — M79.671 PAIN IN RIGHT FOOT: ICD-10-CM

## 2025-02-05 DIAGNOSIS — B35.1 TINEA UNGUIUM: ICD-10-CM

## 2025-02-05 DIAGNOSIS — I73.89 OTHER SPECIFIED PERIPHERAL VASCULAR DISEASES: ICD-10-CM

## 2025-02-05 PROCEDURE — 99213 OFFICE O/P EST LOW 20 MIN: CPT | Mod: 25

## 2025-02-05 PROCEDURE — 11721 DEBRIDE NAIL 6 OR MORE: CPT | Mod: Q8

## 2025-04-30 ENCOUNTER — APPOINTMENT (OUTPATIENT)
Age: 60
End: 2025-04-30

## 2025-05-14 ENCOUNTER — APPOINTMENT (OUTPATIENT)
Dept: VASCULAR SURGERY | Facility: CLINIC | Age: 60
End: 2025-05-14

## 2025-05-21 ENCOUNTER — APPOINTMENT (OUTPATIENT)
Age: 60
End: 2025-05-21
Payer: MEDICARE

## 2025-05-21 DIAGNOSIS — I73.89 OTHER SPECIFIED PERIPHERAL VASCULAR DISEASES: ICD-10-CM

## 2025-05-21 DIAGNOSIS — B35.1 TINEA UNGUIUM: ICD-10-CM

## 2025-05-21 DIAGNOSIS — M79.672 PAIN IN LEFT FOOT: ICD-10-CM

## 2025-05-21 DIAGNOSIS — M79.671 PAIN IN RIGHT FOOT: ICD-10-CM

## 2025-05-21 DIAGNOSIS — R60.0 LOCALIZED EDEMA: ICD-10-CM

## 2025-05-21 PROCEDURE — 11721 DEBRIDE NAIL 6 OR MORE: CPT | Mod: Q8

## 2025-05-21 PROCEDURE — 99213 OFFICE O/P EST LOW 20 MIN: CPT | Mod: 25

## 2025-05-30 ENCOUNTER — APPOINTMENT (OUTPATIENT)
Dept: DERMATOLOGY | Facility: CLINIC | Age: 60
End: 2025-05-30

## 2025-06-04 ENCOUNTER — APPOINTMENT (OUTPATIENT)
Dept: VASCULAR SURGERY | Facility: CLINIC | Age: 60
End: 2025-06-04
Payer: MEDICARE

## 2025-06-04 VITALS
SYSTOLIC BLOOD PRESSURE: 155 MMHG | HEART RATE: 69 BPM | RESPIRATION RATE: 14 BRPM | DIASTOLIC BLOOD PRESSURE: 79 MMHG | OXYGEN SATURATION: 96 % | TEMPERATURE: 97.9 F

## 2025-06-04 DIAGNOSIS — I89.0 LYMPHEDEMA, NOT ELSEWHERE CLASSIFIED: ICD-10-CM

## 2025-06-04 PROCEDURE — 99204 OFFICE O/P NEW MOD 45 MIN: CPT

## 2025-07-21 ENCOUNTER — APPOINTMENT (OUTPATIENT)
Dept: VASCULAR SURGERY | Facility: CLINIC | Age: 60
End: 2025-07-21

## 2025-07-30 ENCOUNTER — APPOINTMENT (OUTPATIENT)
Age: 60
End: 2025-07-30

## 2025-08-20 ENCOUNTER — APPOINTMENT (OUTPATIENT)
Age: 60
End: 2025-08-20
Payer: MEDICARE

## 2025-08-20 DIAGNOSIS — I89.0 LYMPHEDEMA, NOT ELSEWHERE CLASSIFIED: ICD-10-CM

## 2025-08-20 DIAGNOSIS — I73.89 OTHER SPECIFIED PERIPHERAL VASCULAR DISEASES: ICD-10-CM

## 2025-08-20 DIAGNOSIS — B35.1 TINEA UNGUIUM: ICD-10-CM

## 2025-08-20 DIAGNOSIS — M79.672 PAIN IN LEFT FOOT: ICD-10-CM

## 2025-08-20 DIAGNOSIS — L84 CORNS AND CALLOSITIES: ICD-10-CM

## 2025-08-20 DIAGNOSIS — M79.671 PAIN IN RIGHT FOOT: ICD-10-CM

## 2025-08-20 PROCEDURE — 11055 PARING/CUTG B9 HYPRKER LES 1: CPT | Mod: Q8

## 2025-08-20 PROCEDURE — 99213 OFFICE O/P EST LOW 20 MIN: CPT | Mod: 25

## 2025-08-20 PROCEDURE — 11721 DEBRIDE NAIL 6 OR MORE: CPT | Mod: 59,Q8
